# Patient Record
Sex: FEMALE | Race: WHITE | Employment: FULL TIME | URBAN - METROPOLITAN AREA
[De-identification: names, ages, dates, MRNs, and addresses within clinical notes are randomized per-mention and may not be internally consistent; named-entity substitution may affect disease eponyms.]

---

## 2017-01-13 PROCEDURE — 87086 URINE CULTURE/COLONY COUNT: CPT | Performed by: INTERNAL MEDICINE

## 2017-01-24 ENCOUNTER — ANESTHESIA (OUTPATIENT)
Dept: ENDOSCOPY | Facility: HOSPITAL | Age: 47
End: 2017-01-24

## 2017-01-24 ENCOUNTER — HOSPITAL ENCOUNTER (OUTPATIENT)
Facility: HOSPITAL | Age: 47
Setting detail: HOSPITAL OUTPATIENT SURGERY
Discharge: HOME | End: 2017-01-24
Attending: SURGERY | Admitting: SURGERY
Payer: COMMERCIAL

## 2017-01-24 ENCOUNTER — SURGERY (OUTPATIENT)
Age: 47
End: 2017-01-24

## 2017-01-24 ENCOUNTER — ANESTHESIA EVENT (OUTPATIENT)
Dept: ENDOSCOPY | Facility: HOSPITAL | Age: 47
End: 2017-01-24

## 2017-01-24 VITALS
TEMPERATURE: 98 F | BODY MASS INDEX: 21.99 KG/M2 | WEIGHT: 112 LBS | HEART RATE: 65 BPM | DIASTOLIC BLOOD PRESSURE: 102 MMHG | SYSTOLIC BLOOD PRESSURE: 145 MMHG | OXYGEN SATURATION: 97 % | HEIGHT: 60 IN | RESPIRATION RATE: 16 BRPM

## 2017-01-24 PROCEDURE — 0DBN8ZX EXCISION OF SIGMOID COLON, VIA NATURAL OR ARTIFICIAL OPENING ENDOSCOPIC, DIAGNOSTIC: ICD-10-PCS | Performed by: SURGERY

## 2017-01-24 PROCEDURE — 88305 TISSUE EXAM BY PATHOLOGIST: CPT | Performed by: SURGERY

## 2017-01-24 PROCEDURE — 3E0H8GC INTRODUCTION OF OTHER THERAPEUTIC SUBSTANCE INTO LOWER GI, VIA NATURAL OR ARTIFICIAL OPENING ENDOSCOPIC: ICD-10-PCS | Performed by: SURGERY

## 2017-01-24 RX ORDER — SODIUM CHLORIDE, SODIUM LACTATE, POTASSIUM CHLORIDE, CALCIUM CHLORIDE 600; 310; 30; 20 MG/100ML; MG/100ML; MG/100ML; MG/100ML
INJECTION, SOLUTION INTRAVENOUS CONTINUOUS
Status: DISCONTINUED | OUTPATIENT
Start: 2017-01-24 | End: 2017-01-24

## 2017-01-24 RX ORDER — NALOXONE HYDROCHLORIDE 0.4 MG/ML
80 INJECTION, SOLUTION INTRAMUSCULAR; INTRAVENOUS; SUBCUTANEOUS AS NEEDED
Status: DISCONTINUED | OUTPATIENT
Start: 2017-01-24 | End: 2017-01-24

## 2017-01-24 NOTE — OPERATIVE REPORT
[facility]    PATIENT'S NAME: Darling Vyas   ATTENDING PHYSICIAN: Lianna Lee M.D. OPERATING PHYSICIAN: Lianna Lee M.D.    PATIENT ACCOUNT#:   [de-identified]    LOCATION:  ENDO  ENDO POOL ROOMS 5 EDWP 10  MEDICAL RECORD #:   [id number]       DATE OF BI M.D.  d: 01/24/2017 09:01:22  t: 01/24/2017 10:45:40  [facility] [id number]  BG/    cc: Ronald Miguel M.D.

## 2017-01-24 NOTE — INTERVAL H&P NOTE
Pre-op Diagnosis: RECTAL BLEEDING      The above referenced H&P was reviewed by Luciana Figueroa MD on 1/24/2017, the patient was examined and no significant changes have occurred in the patient's condition since the H&P was performed.   I discussed with the pat

## 2017-01-24 NOTE — ANESTHESIA PREPROCEDURE EVALUATION
PRE-OP EVALUATION    Patient Name: Yadira Tapia    Pre-op Diagnosis: RECTAL BLEEDING      Procedure(s):  COLONOSCOPY, POSSIBLE BIOPSY, POSSIBLE POLYPECTOMY      Surgeon(s) and Role:     Addie Brasher MD - Primary    Pre-op vitals reviewed.   Temp: 97.9 °F 01/13/2017   HCT 40.1 01/13/2017   MCV 87.0 01/13/2017   MCH 28.2 01/13/2017   MCHC 32.4 01/13/2017   RDW 12.5 01/13/2017    01/13/2017       Lab Results  Component Value Date    01/13/2017   K 3.9 01/13/2017   K 4.20 01/10/2017    01/13

## 2017-01-24 NOTE — BRIEF OP NOTE
[facility] ENDOSCOPY  Brief Op Note     Aamir Alex Location: OR   [facility] [id number] N [id number]   Admission Date 1/24/2017 Operation Date 1/24/2017   Attending Physician Ricky Richard MD Operating Physician Liliana Boast, MD       Pre-Operative Diagnosis:

## 2017-01-24 NOTE — ANESTHESIA POSTPROCEDURE EVALUATION
[facility] Patient Status:  Hospital Outpatient Surgery   Age/Gender 40year old female MRN [id number]   Location [address] [place] [address]. Attending David Florence MD   Hosp Day # 0 PCP Idris Hendricks MD       Anesthesia Post-op Note    Pr

## 2017-01-24 NOTE — H&P (VIEW-ONLY)
1/18/2017    Patient presents with:  Consult: LLQ pain for 1 week discuss having colonoscopy. ref by Dr. Graciela Lopez. HPI:    Nishant Millard is a 40year old female who presents today for colonoscopy evaluation.  Patient gives a couple month history of re soft with mild left lower clot or tenderness on exam. No guarding or peritoneal findings.   LYMPHATIC: no lymphadenopathy  EXTREMITIES: no cyanosis or edema  RECTAL: deferred until colonoscopy                    IMPRESSION:    Rectal bleeding  Family histor

## 2017-09-29 PROBLEM — M79.10 MYALGIA: Status: ACTIVE | Noted: 2017-09-29

## (undated) DEVICE — Device: Brand: SPOT EX ENDOSCOPIC TATTOO

## (undated) DEVICE — Device: Brand: DEFENDO AIR/WATER/SUCTION AND BIOPSY VALVE

## (undated) DEVICE — TRAP 4 CPTR CHMBR N EZ INLN

## (undated) DEVICE — SNARE CAPTIFLEX MICRO-OVL OLY

## (undated) DEVICE — REM POLYHESIVE ADULT PATIENT RETURN ELECTRODE: Brand: VALLEYLAB

## (undated) DEVICE — NEEDLE CONTRAST INTERJECT 25G

## (undated) NOTE — IP AVS SNAPSHOT
[facility] [place] [address] [place], [address] ~ [phone]                Discharge Summary   1/24/2017    Marylin Gusman           Admission Information        Provider Department    1/24/2017 Justice Wagner MD  Endoscopy      Surg biopsy, please call them for your results.       Discharge References/Attachments     DIET, EATING A HIGH-FIBER  (ENGLISH)    COLON AND RECTAL POLYPS, UNDERSTANDING (ENGLISH)    HEMORRHOIDS, DIAGNOSING (ENGLISH)      Instructions and Information about Your - If you are a smoker or have smoked in the last 12 months, we encourage you to explore options for quitting.     - If you have concerns related to behavioral health issues or thoughts of harming yourself, contact [facility] a